# Patient Record
(demographics unavailable — no encounter records)

---

## 2019-03-01 NOTE — MRI
MRI BRAIN WITH AND WITHOUT CONTRAST:

 

Date:  03/01/19 

 

HISTORY:  

Dementia. 

 

COMPARISON:  

None. 

 

FINDINGS:

No hemorrhage on the axial gradient echo sequence. 

 

No parenchymal mass, mass effect, or midline shift. Brain volume is age-appropriate. Cortical gray-wh
ite matter differentiation preserved. 

 

There is a focal area of malacic change with gliosis involving the right occipital lobe. Remainder of
 the cerebrum demonstrates preservation of cortical gray-white matter differentiation. 

 

T2 and FLAIR white matter hyperintensities due to chronic small vessel ischemic changes. 

 

Central arterial flow-voids are maintained. Absent restricted diffusion. 

 

Adequate aeration of the sinuses and mastoid air cells. Minimal opacification of the right mastoid ai
r cells is of doubtful significance. 

 

No pathologic enhancement of the brain parenchyma. 

 

IMPRESSION: 

Essentially unremarkable pre and postcontrast brain MRI. Brain volume is age-appropriate. 

 

 

POS: [facility]